# Patient Record
Sex: FEMALE | Race: WHITE
[De-identification: names, ages, dates, MRNs, and addresses within clinical notes are randomized per-mention and may not be internally consistent; named-entity substitution may affect disease eponyms.]

---

## 2021-10-13 ENCOUNTER — HOSPITAL ENCOUNTER (EMERGENCY)
Dept: HOSPITAL 63 - ER | Age: 76
Discharge: HOME | End: 2021-10-13
Payer: MEDICARE

## 2021-10-13 VITALS — HEIGHT: 67 IN | WEIGHT: 169.32 LBS | BODY MASS INDEX: 26.57 KG/M2

## 2021-10-13 VITALS
DIASTOLIC BLOOD PRESSURE: 86 MMHG | SYSTOLIC BLOOD PRESSURE: 187 MMHG | DIASTOLIC BLOOD PRESSURE: 86 MMHG | SYSTOLIC BLOOD PRESSURE: 187 MMHG

## 2021-10-13 DIAGNOSIS — R19.7: ICD-10-CM

## 2021-10-13 DIAGNOSIS — I10: ICD-10-CM

## 2021-10-13 DIAGNOSIS — Z86.73: ICD-10-CM

## 2021-10-13 DIAGNOSIS — Z20.822: ICD-10-CM

## 2021-10-13 DIAGNOSIS — R13.10: Primary | ICD-10-CM

## 2021-10-13 PROCEDURE — 99285 EMERGENCY DEPT VISIT HI MDM: CPT

## 2021-10-13 PROCEDURE — 93005 ELECTROCARDIOGRAM TRACING: CPT

## 2021-10-13 PROCEDURE — 70360 X-RAY EXAM OF NECK: CPT

## 2021-10-13 PROCEDURE — 71045 X-RAY EXAM CHEST 1 VIEW: CPT

## 2021-10-13 PROCEDURE — U0003 INFECTIOUS AGENT DETECTION BY NUCLEIC ACID (DNA OR RNA); SEVERE ACUTE RESPIRATORY SYNDROME CORONAVIRUS 2 (SARS-COV-2) (CORONAVIRUS DISEASE [COVID-19]), AMPLIFIED PROBE TECHNIQUE, MAKING USE OF HIGH THROUGHPUT TECHNOLOGIES AS DESCRIBED BY CMS-2020-01-R: HCPCS

## 2021-10-13 PROCEDURE — C9803 HOPD COVID-19 SPEC COLLECT: HCPCS

## 2021-10-13 NOTE — EKG
[facility] [address], [place] [address]

Test Date:    2021-10-13               Test Time:    14:21:41

Pat Name:     HERMINIO BECKER         Department:   

Patient ID:   [id number]           Room:          

Gender:       F                        Technician:   TRE

:          1945               Requested By: RICH SNYDER

Order Number: [id number]            Reading MD:   Lester Jones

                                 Measurements

Intervals                              Axis          

Rate:         80                       P:            90

IA:           130                      QRS:          36

QRSD:         72                       T:            93

QT:           326                                    

QTc:          379                                    

                           Interpretive Statements

SINUS RHYTHM

ST & T ABNORMALITY, CONSIDER

ANTERIOR ISCHEMIA OR LEFT VENTRICULAR STRAIN

Electronically Signed On 10- 16:02:01 CDT by Lester Jones

## 2021-10-13 NOTE — RAD
AP chest, 2 views soft tissue neck



HISTORY: Dysphasia



AP view was taken of the chest. Lungs are free of infiltrates. Heart is normal in size. There are old
 left rib fractures. 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 There is no pleural effusion.



2 views soft tissue neck



AP and lateral soft tissue views were taken of the neck. Epiglottis is not enlarged. There is no retr
opharyngeal soft tissue swelling. There is degenerative disc disease throughout the cervical spine. A
 soft tissue mass is not definitely identified.





IMPRESSION:

1. No acute infiltrates.

2. Degenerative changes and scoliosis in the cervical spine.

2. Epiglottis unremarkable.

3. No abnormal soft tissue swelling or mass noted in the pharynx.



Electronically signed by: Justin Valente MD (10/13/2021 2:12 PM) [facility]

## 2021-10-13 NOTE — RAD
AP chest, 2 views soft tissue neck



HISTORY: Dysphasia



AP view was taken of the chest. Lungs are free of infiltrates. Heart is normal in size. There are old
 left rib fractures. 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 There is no pleural effusion.



2 views soft tissue neck



AP and lateral soft tissue views were taken of the neck. Epiglottis is not enlarged. There is no retr
opharyngeal soft tissue swelling. There is degenerative disc disease throughout the cervical spine. A
 soft tissue mass is not definitely identified.





IMPRESSION:

1. No acute infiltrates.

2. Degenerative changes and scoliosis in the cervical spine.

2. Epiglottis unremarkable.

3. No abnormal soft tissue swelling or mass noted in the pharynx.



Electronically signed by: Justin Valente MD (10/13/2021 2:12 PM) [facility]

## 2021-10-13 NOTE — PHYS DOC
Adult General


Chief Complaint


Chief Complaint:  SORE THROAT





HPI


HPI





Patient is a 76-year-old female with presenting for multiple complaints.  First,

she complains of dysphagia.  Feels like since yesterday without any known 

ingestion, trauma or other known mechanism of injury having difficulty 

swallowing.  She points to the base of the throat near her sternum and states 

that it feels like things are getting caught when she tries to swallow.  No 

obvious aspiration, no history of any esophageal abnormalities, she has never 

had an upper endoscopy in the past.  She has still been able to tolerate p.o. 

intake but reports she has been scared to do so since onset.  She also reports 

of development of diarrhea that started approximately 72 hours ago.  She has 

only had a couple episodes, less than 5.  They have not been explosive in 

nature, nonbloody but patient does admit she is concerned as they have been 

looser than usual.  She admits she is unable vaccinated against COVID-19, she 

stays home and has majority of her health cared for by daughter and home health 

nurse.  While she is here she is wanting her right ankle to be evaluated.  She 

has chronic swelling to this without any obvious mechanism of injury, trauma or 

other exposure.  This issue was raised to her home health nurse several weeks 

ago but it was determined at that time to be nonemergent and close outpatient 

follow-up with primary care provider was advised.





Review of Systems


Review of Systems


Fourteen body systems of review of systems have been reviewed. See HPI for 

pertinent positives and negative responses, other wise all other systems are 

negative, non-pertinent or non-contributory





Allergies


Allergies





Allergies








Coded Allergies Type Severity Reaction Last Updated Verified


 


  No Known Drug Allergies    10/13/21 No











Physical Exam


Physical Exam


Constitutional: Well developed, well nourished, no acute distress, non-toxic 

appearance. 


HENT: Normocephalic, atraumatic, bilateral external ears normal, oropharynx 

moist, no oral exudates, nose normal. 


Eyes: PERRLA, EOMI, conjunctiva normal, no discharge.  


Neck: Normal range of motion, no tenderness, supple, no stridor.  


Cardiovascular: Heart rate regular, sinus rhythm, no murmurs rubs or gallops


Lungs & Thorax:  Bilateral breath sounds clear to auscultation 


Abdomen: Bowel sounds normal, soft, no tenderness, no masses, no pulsatile 

masses.  Nonsurgical abdomen, no peritoneal signs


Skin: Warm, dry, no erythema, no rash.  


Back: No tenderness, no CVA tenderness.  


Extremities: No tenderness, no cyanosis, no clubbing, ROM intact, no edema.  


Neurologic: Alert and oriented X 3, grossly normal motor & sensory function, no 

focal deficits noted. 


Psychologic: Affect normal, judgement normal, mood normal.





Current Patient Data


Vital Signs





Vital Signs








  Date Time  Temp Pulse Resp B/P (MAP) Pulse Ox O2 Delivery O2 Flow Rate FiO2


 


10/13/21 13:28 97.8 70 18 196/98 (130) 92 Room Air  








Vital Signs








  Date Time  Temp Pulse Resp B/P (MAP) Pulse Ox O2 Delivery O2 Flow Rate FiO2


 


10/13/21 14:19  77  197/83    


 


10/13/21 14:14   18  93 Room Air  


 


10/13/21 13:28 97.8       











EKG


EKG


EKG ordered and interpreted by myself at 1425 hrs. as sinus rhythm at 80 bpm, 

unremarkable intervals, no axis deviation, nonspecific ST wave abnormality in 

leads V3 through V6, no STEMI, no prior EKG to compare to





Radiology/Procedures


Radiology/Procedures





AP chest, 2 views soft tissue neck





HISTORY: Dysphasia





AP view was taken of the chest. Lungs are free of infiltrates. Heart is normal 

in size. There are old left rib fractures. 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

There is no pleural effusion.





2 views soft tissue neck





AP and lateral soft tissue views were taken of the neck. Epiglottis is not 

enlarged. There is no retropharyngeal soft tissue swelling. There is 

degenerative disc disease throughout the cervical spine. A soft tissue mass is 

not definitely identified.








IMPRESSION:


1. No acute infiltrates.


2. Degenerative changes and scoliosis in the cervical spine.


2. Epiglottis unremarkable.


3. No abnormal soft tissue swelling or mass noted in the pharynx.





Electronically signed by: Justin Valente MD (10/13/2021 2:12 PM) [facility]





Heart Score


C/O Chest Pain:  No


HEART Score for Chest Pain:  








HEART Score for Chest Pain Response (Comments) Value


 


History Slighlty/Non-Suspicious 0


 


ECG Nonspecific Repolarizatio 1


 


Age > 65 2


 


Risk Factors >3 Risk Factors or Hx CAD 2


 


Troponin < Normal Limit 0


 


Total  5








Risk Factors:


Risk Factors:  DM, Current or recent (<one month) smoker, HTN, HLP, family 

history of CAD, obesity.


Risk Scores:


Risk Factors:  DM, Current or recent (<one month) smoker, HTN, HLP, family 

history of CAD, obesity.





Course & Med Decision Making


Course & Med Decision Making


Airway patent, breathing unlabored, IV access and vitals obtained concerning for

hypertension only


History and physical exam grossly unremarkable.  EKG with nonspecific ST wave 

abnormalities in anterolateral leads, patient has no chest pain.  Radiographs 

unremarkable for any obvious foreign body


Nitro administered with improvement in lower esophageal dysphagia.  Bedside 

swallow screen performed by RN and unremarkable, patient reports symptoms 

resolved and no longer has ongoing foreign body sensation in throat


I discussed case with patient and daughter at bedside, I disclosed this is 

complex given her past history of CVA with questionable dysphagia in the past.  

Overall feeling of foreign body resolved and patient feels at baseline.  Okay 

for PCP follow-up and further eval by GI


Patient was tested for COVID-19 today given that she is unvaccinated, has no 

obvious sick contacts but several individuals such as home health nurses coming 

into her home and recent development of diarrhea.  Appropriate quarantine 

precautions and supportive care instructions advised


Right ankle examination grossly unremarkable.  No further need for further 

diagnostic work-up in ER setting.  Outpatient PCP follow-up advised


Strict return precautions were discussed at length with patient and daughter at 

bedside.  All questions and concerns addressed prior to departure





Dragon Disclaimer


Dragon Disclaimer


This electronic medical record was generated, in whole or in part, using a voice

recognition dictation system.





Departure


Departure:


Impression:  


   Primary Impression:  


   Dysphagia


   Additional Impressions:  


   History of CVA (cerebrovascular accident)


   Hypertension


   Person under investigation for COVID-19


   Diarrhea


Disposition:  01 HOME / SELF CARE / HOMELESS


Condition:  IMPROVED


Referrals:  


MICHELLE BERMAN MD (PCP)





Additional Instructions:  


As discussed prior to departure, your vitals were concerning for hypertension.  

Your physical exam and comprehensive ER work-up was nonconcerning for any 

emergent or surgical issues


Your high blood pressure was likely due to not taking all of your daily blood 

pressure medications due to presenting symptoms.  This improved with 

administered nitroglycerin and you should continue to take your home 

hypertensives with blood pressure log and primary care review


Your feelings of foreign body and inability to swallow are concerning but did 

improve after nitroglycerin.  Your airway was patent.  Further work-up and 

investigation is needed, as disclosed you might be referred to a GI physician 

for further diagnostic work-up


He also endorsed symptoms of diarrhea.  Given presenting complaints in fact that

you are unvaccinated you were tested for COVID-19.  These results will come back

within the next 24 hours and be communicated to you.  In the meantime it is 

pertinent to provide supportive care to self and quarantine at home


If any concerning signs or symptoms present prior to outpatient follow-up please

do not hesitate to come back for repeat evaluation.  It was a pleasure to take 

care of you and I wish you the best going forward





Problem Qualifiers











RICH SNYDER DO                 Oct 13, 2021 13:32

## 2021-11-04 ENCOUNTER — HOSPITAL ENCOUNTER (OUTPATIENT)
Dept: HOSPITAL 63 - RAD | Age: 76
End: 2021-11-04
Attending: SPECIALIST
Payer: MEDICARE

## 2021-11-04 DIAGNOSIS — M25.871: ICD-10-CM

## 2021-11-04 DIAGNOSIS — M85.88: Primary | ICD-10-CM

## 2021-11-04 PROCEDURE — 73590 X-RAY EXAM OF LOWER LEG: CPT
